# Patient Record
Sex: FEMALE | Race: WHITE | URBAN - METROPOLITAN AREA
[De-identification: names, ages, dates, MRNs, and addresses within clinical notes are randomized per-mention and may not be internally consistent; named-entity substitution may affect disease eponyms.]

---

## 2017-05-05 ENCOUNTER — OFFICE VISIT (OUTPATIENT)
Dept: FAMILY MEDICINE CLINIC | Age: 62
End: 2017-05-05

## 2017-05-05 VITALS
SYSTOLIC BLOOD PRESSURE: 124 MMHG | BODY MASS INDEX: 42.51 KG/M2 | OXYGEN SATURATION: 98 % | HEART RATE: 100 BPM | HEIGHT: 64 IN | RESPIRATION RATE: 12 BRPM | DIASTOLIC BLOOD PRESSURE: 76 MMHG | WEIGHT: 249 LBS

## 2017-05-05 DIAGNOSIS — J30.1 SEASONAL ALLERGIC RHINITIS DUE TO POLLEN: ICD-10-CM

## 2017-05-05 DIAGNOSIS — R73.9 HYPERGLYCEMIA: ICD-10-CM

## 2017-05-05 DIAGNOSIS — I89.0 LYMPHEDEMA OF LEFT ARM: Primary | ICD-10-CM

## 2017-05-05 DIAGNOSIS — E78.00 HYPERCHOLESTEREMIA: ICD-10-CM

## 2017-05-05 DIAGNOSIS — Z85.3 HISTORY OF BREAST CANCER: ICD-10-CM

## 2017-05-05 DIAGNOSIS — E55.9 VITAMIN D DEFICIENCY: ICD-10-CM

## 2017-05-05 PROCEDURE — 99214 OFFICE O/P EST MOD 30 MIN: CPT | Performed by: FAMILY MEDICINE

## 2017-05-05 RX ORDER — LISINOPRIL 10 MG/1
TABLET ORAL
Qty: 90 TABLET | Refills: 3 | Status: SHIPPED | OUTPATIENT
Start: 2017-05-05 | End: 2018-05-17 | Stop reason: SDUPTHER

## 2017-05-05 ASSESSMENT — PATIENT HEALTH QUESTIONNAIRE - PHQ9
1. LITTLE INTEREST OR PLEASURE IN DOING THINGS: 0
SUM OF ALL RESPONSES TO PHQ9 QUESTIONS 1 & 2: 0
2. FEELING DOWN, DEPRESSED OR HOPELESS: 0
SUM OF ALL RESPONSES TO PHQ QUESTIONS 1-9: 0

## 2017-05-24 LAB
ALBUMIN SERPL-MCNC: 3.9 G/DL
ALP BLD-CCNC: 64 U/L
ALT SERPL-CCNC: 24 U/L
AST SERPL-CCNC: 19 U/L
BASOPHILS ABSOLUTE: NORMAL /ΜL
BASOPHILS RELATIVE PERCENT: NORMAL %
BILIRUB SERPL-MCNC: 0.7 MG/DL (ref 0.1–1.4)
BUN BLDV-MCNC: 19 MG/DL
CALCIUM SERPL-MCNC: 9.4 MG/DL
CHLORIDE BLD-SCNC: 104 MMOL/L
CHOLESTEROL, TOTAL: 194 MG/DL
CHOLESTEROL/HDL RATIO: ABNORMAL
CO2: 28 MMOL/L
CREAT SERPL-MCNC: 0.63 MG/DL
EOSINOPHILS ABSOLUTE: NORMAL /ΜL
EOSINOPHILS RELATIVE PERCENT: NORMAL %
GFR CALCULATED: >90
GLUCOSE BLD-MCNC: 137 MG/DL
HCT VFR BLD CALC: 41.4 % (ref 36–46)
HDLC SERPL-MCNC: 41 MG/DL (ref 35–70)
HEMOGLOBIN: 14 G/DL (ref 12–16)
LDL CHOLESTEROL CALCULATED: 123 MG/DL (ref 0–160)
LYMPHOCYTES ABSOLUTE: NORMAL /ΜL
LYMPHOCYTES RELATIVE PERCENT: NORMAL %
MCH RBC QN AUTO: 29.1 PG
MCHC RBC AUTO-ENTMCNC: 33.9 G/DL
MCV RBC AUTO: 85.8 FL
MONOCYTES ABSOLUTE: NORMAL /ΜL
MONOCYTES RELATIVE PERCENT: NORMAL %
NEUTROPHILS ABSOLUTE: NORMAL /ΜL
NEUTROPHILS RELATIVE PERCENT: NORMAL %
PLATELET # BLD: 246 K/ΜL
PMV BLD AUTO: 7.7 FL
POTASSIUM SERPL-SCNC: 4.5 MMOL/L
RBC # BLD: 4.83 10^6/ΜL
SODIUM BLD-SCNC: 139 MMOL/L
TOTAL PROTEIN: 6.4
TRIGL SERPL-MCNC: 149 MG/DL
VITAMIN D 25-HYDROXY: 60.9
VITAMIN D2, 25 HYDROXY: NORMAL
VITAMIN D3,25 HYDROXY: NORMAL
VLDLC SERPL CALC-MCNC: ABNORMAL MG/DL
WBC # BLD: 5.3 10^3/ML

## 2017-11-27 ENCOUNTER — TELEPHONE (OUTPATIENT)
Dept: FAMILY MEDICINE CLINIC | Age: 62
End: 2017-11-27

## 2017-11-27 DIAGNOSIS — E78.6 LOW HDL (UNDER 40): Primary | ICD-10-CM

## 2017-11-28 LAB
ALBUMIN SERPL-MCNC: 4 G/DL
ALP BLD-CCNC: 58 U/L
ALT SERPL-CCNC: 18 U/L
ANION GAP SERPL CALCULATED.3IONS-SCNC: ABNORMAL MMOL/L
AST SERPL-CCNC: 15 U/L
AVERAGE GLUCOSE: NORMAL
BASOPHILS ABSOLUTE: NORMAL /ΜL
BASOPHILS RELATIVE PERCENT: NORMAL %
BILIRUB SERPL-MCNC: 0.5 MG/DL (ref 0.1–1.4)
BUN BLDV-MCNC: 23 MG/DL
CALCIUM SERPL-MCNC: 9.1 MG/DL
CHLORIDE BLD-SCNC: 105 MMOL/L
CHOLESTEROL, TOTAL: 196 MG/DL
CHOLESTEROL/HDL RATIO: NORMAL
CO2: 27 MMOL/L
CREAT SERPL-MCNC: 0.67 MG/DL
EOSINOPHILS ABSOLUTE: NORMAL /ΜL
EOSINOPHILS RELATIVE PERCENT: NORMAL %
GFR CALCULATED: ABNORMAL
GLUCOSE BLD-MCNC: 147 MG/DL
HBA1C MFR BLD: 6.3 %
HCT VFR BLD CALC: 42 % (ref 36–46)
HDLC SERPL-MCNC: 44 MG/DL (ref 35–70)
HEMOGLOBIN: 14 G/DL (ref 12–16)
LDL CHOLESTEROL CALCULATED: 119 MG/DL (ref 0–160)
LYMPHOCYTES ABSOLUTE: NORMAL /ΜL
LYMPHOCYTES RELATIVE PERCENT: NORMAL %
MCH RBC QN AUTO: 29.5 PG
MCHC RBC AUTO-ENTMCNC: 33.3 G/DL
MCV RBC AUTO: 88.8 FL
MONOCYTES ABSOLUTE: NORMAL /ΜL
MONOCYTES RELATIVE PERCENT: NORMAL %
NEUTROPHILS ABSOLUTE: NORMAL /ΜL
NEUTROPHILS RELATIVE PERCENT: NORMAL %
PDW BLD-RTO: 15.1 %
PLATELET # BLD: 251 K/ΜL
PMV BLD AUTO: 7.8 FL
POTASSIUM SERPL-SCNC: 4.3 MMOL/L
RBC # BLD: 4.73 10^6/ΜL
SODIUM BLD-SCNC: 135 MMOL/L
TOTAL PROTEIN: 6.6
TRIGL SERPL-MCNC: 164 MG/DL
VLDLC SERPL CALC-MCNC: NORMAL MG/DL
WBC # BLD: 6.4 10^3/ML

## 2018-01-09 NOTE — TELEPHONE ENCOUNTER
[employer] MESSAGE SENT TO PT, PT DUE FOR BLOOD WORK.   [facility]

## 2018-01-10 ENCOUNTER — TELEPHONE (OUTPATIENT)
Dept: FAMILY MEDICINE CLINIC | Age: 63
End: 2018-01-10

## 2018-03-09 ENCOUNTER — OFFICE VISIT (OUTPATIENT)
Dept: FAMILY MEDICINE CLINIC | Age: 63
End: 2018-03-09

## 2018-03-09 VITALS
RESPIRATION RATE: 18 BRPM | DIASTOLIC BLOOD PRESSURE: 74 MMHG | WEIGHT: 252 LBS | HEIGHT: 64 IN | BODY MASS INDEX: 43.02 KG/M2 | HEART RATE: 94 BPM | SYSTOLIC BLOOD PRESSURE: 128 MMHG

## 2018-03-09 DIAGNOSIS — G62.9 NEUROPATHY: ICD-10-CM

## 2018-03-09 DIAGNOSIS — E66.9 DIABETES MELLITUS TYPE 2 IN OBESE (HCC): Primary | ICD-10-CM

## 2018-03-09 DIAGNOSIS — E11.69 DIABETES MELLITUS TYPE 2 IN OBESE (HCC): Primary | ICD-10-CM

## 2018-03-09 PROCEDURE — 99213 OFFICE O/P EST LOW 20 MIN: CPT | Performed by: FAMILY MEDICINE

## 2018-03-09 RX ORDER — CHLORAL HYDRATE 500 MG
3000 CAPSULE ORAL 3 TIMES DAILY
COMMUNITY
End: 2020-01-01 | Stop reason: ALTCHOICE

## 2018-05-01 ENCOUNTER — HOSPITAL ENCOUNTER (OUTPATIENT)
Dept: OTHER | Age: 63
Discharge: HOME | End: 2018-05-01
Attending: FAMILY MEDICINE | Admitting: FAMILY MEDICINE

## 2018-05-01 ENCOUNTER — HOSPITAL ENCOUNTER (OUTPATIENT)
Dept: DIABETES SERVICES | Age: 63
Discharge: HOME | End: 2018-05-31
Admitting: FAMILY MEDICINE

## 2018-05-01 ENCOUNTER — HOSPITAL ENCOUNTER (OUTPATIENT)
Dept: DIABETES SERVICES | Age: 63
Discharge: HOME | End: 2018-04-30
Admitting: FAMILY MEDICINE

## 2018-05-01 DIAGNOSIS — E11.69 TYPE 2 DIABETES MELLITUS WITH OTHER SPECIFIED COMPLICATION (HCC): ICD-10-CM

## 2018-05-01 ASSESSMENT — PATIENT HEALTH QUESTIONNAIRE - PHQ9
SUM OF ALL RESPONSES TO PHQ QUESTIONS 1-9: 0
SUM OF ALL RESPONSES TO PHQ9 QUESTIONS 1 & 2: 0
1. LITTLE INTEREST OR PLEASURE IN DOING THINGS: 0
2. FEELING DOWN, DEPRESSED OR HOPELESS: 0

## 2018-05-17 RX ORDER — LISINOPRIL 10 MG/1
TABLET ORAL
Qty: 30 TABLET | Refills: 2 | Status: SHIPPED | OUTPATIENT
Start: 2018-05-17 | End: 2019-02-18 | Stop reason: SDUPTHER

## 2018-07-17 ENCOUNTER — HOSPITAL ENCOUNTER (OUTPATIENT)
Dept: OTHER | Age: 63
Discharge: HOME | End: 2018-07-31
Attending: FAMILY MEDICINE | Admitting: FAMILY MEDICINE

## 2018-07-17 ENCOUNTER — HOSPITAL ENCOUNTER (OUTPATIENT)
Dept: DIABETES SERVICES | Age: 63
Discharge: HOME | End: 2018-07-18
Admitting: FAMILY MEDICINE

## 2018-07-17 DIAGNOSIS — E11.69 TYPE 2 DIABETES MELLITUS WITH OTHER SPECIFIED COMPLICATION (HCC): ICD-10-CM

## 2018-08-01 ENCOUNTER — HOSPITAL ENCOUNTER (OUTPATIENT)
Dept: OTHER | Age: 63
Discharge: HOME | End: 2018-08-31
Attending: FAMILY MEDICINE | Admitting: FAMILY MEDICINE

## 2018-08-21 ENCOUNTER — HOSPITAL ENCOUNTER (OUTPATIENT)
Dept: DIABETES SERVICES | Age: 63
Discharge: HOME | End: 2018-08-22
Admitting: FAMILY MEDICINE

## 2018-08-21 DIAGNOSIS — E11.69 TYPE 2 DIABETES MELLITUS WITH OTHER SPECIFIED COMPLICATION (HCC): ICD-10-CM

## 2018-08-21 NOTE — PROGRESS NOTES
Patient attended Dietitian 2 Group Class  Reviewed and patient demonstrated understanding of the following:  Nutrition guidelines to maintain cardiovascular health  Strategies to deal with social events, restaurant meals, and emotion-based eating  Goal Setting    REFERRING PROVIDER: Saw Powell MD    Total participants in Group:2

## 2018-09-01 ENCOUNTER — HOSPITAL ENCOUNTER (OUTPATIENT)
Dept: OTHER | Age: 63
Discharge: HOME | End: 2018-09-01
Attending: FAMILY MEDICINE | Admitting: FAMILY MEDICINE

## 2018-10-17 LAB
AVERAGE GLUCOSE: NORMAL
HBA1C MFR BLD: 6.4 %

## 2018-10-24 ENCOUNTER — OFFICE VISIT (OUTPATIENT)
Dept: FAMILY MEDICINE CLINIC | Age: 63
End: 2018-10-24
Payer: MEDICARE

## 2018-10-24 VITALS
HEIGHT: 64 IN | BODY MASS INDEX: 41.32 KG/M2 | RESPIRATION RATE: 16 BRPM | SYSTOLIC BLOOD PRESSURE: 130 MMHG | DIASTOLIC BLOOD PRESSURE: 74 MMHG | WEIGHT: 242 LBS | HEART RATE: 66 BPM

## 2018-10-24 DIAGNOSIS — Z85.3 HISTORY OF BREAST CANCER: ICD-10-CM

## 2018-10-24 DIAGNOSIS — G62.89 OTHER POLYNEUROPATHY: ICD-10-CM

## 2018-10-24 DIAGNOSIS — I10 ESSENTIAL HYPERTENSION, BENIGN: Primary | ICD-10-CM

## 2018-10-24 DIAGNOSIS — E78.00 PURE HYPERCHOLESTEROLEMIA: ICD-10-CM

## 2018-10-24 DIAGNOSIS — I89.0 LYMPHEDEMA OF LEFT ARM: ICD-10-CM

## 2018-10-24 DIAGNOSIS — J30.1 SEASONAL ALLERGIC RHINITIS DUE TO POLLEN: ICD-10-CM

## 2018-10-24 DIAGNOSIS — Z23 NEED FOR INFLUENZA VACCINATION: ICD-10-CM

## 2018-10-24 PROCEDURE — 1123F ACP DISCUSS/DSCN MKR DOCD: CPT | Performed by: FAMILY MEDICINE

## 2018-10-24 PROCEDURE — 1036F TOBACCO NON-USER: CPT | Performed by: FAMILY MEDICINE

## 2018-10-24 PROCEDURE — G8417 CALC BMI ABV UP PARAM F/U: HCPCS | Performed by: FAMILY MEDICINE

## 2018-10-24 PROCEDURE — G8427 DOCREV CUR MEDS BY ELIG CLIN: HCPCS | Performed by: FAMILY MEDICINE

## 2018-10-24 PROCEDURE — G8400 PT W/DXA NO RESULTS DOC: HCPCS | Performed by: FAMILY MEDICINE

## 2018-10-24 PROCEDURE — 3017F COLORECTAL CA SCREEN DOC REV: CPT | Performed by: FAMILY MEDICINE

## 2018-10-24 PROCEDURE — 4040F PNEUMOC VAC/ADMIN/RCVD: CPT | Performed by: FAMILY MEDICINE

## 2018-10-24 PROCEDURE — 90670 PCV13 VACCINE IM: CPT | Performed by: FAMILY MEDICINE

## 2018-10-24 PROCEDURE — 99214 OFFICE O/P EST MOD 30 MIN: CPT | Performed by: FAMILY MEDICINE

## 2018-10-24 PROCEDURE — G0009 ADMIN PNEUMOCOCCAL VACCINE: HCPCS | Performed by: FAMILY MEDICINE

## 2018-10-24 PROCEDURE — 1101F PT FALLS ASSESS-DOCD LE1/YR: CPT | Performed by: FAMILY MEDICINE

## 2018-10-24 PROCEDURE — G8482 FLU IMMUNIZE ORDER/ADMIN: HCPCS | Performed by: FAMILY MEDICINE

## 2018-10-24 PROCEDURE — 1090F PRES/ABSN URINE INCON ASSESS: CPT | Performed by: FAMILY MEDICINE

## 2018-10-24 RX ORDER — FLUCONAZOLE 150 MG/1
150 TABLET ORAL ONCE
Qty: 1 TABLET | Refills: 0 | Status: SHIPPED | OUTPATIENT
Start: 2018-10-24 | End: 2018-10-24

## 2018-10-24 RX ORDER — L-METHYLFOLATE-ALGAE-VIT B12-B6 CAP 3-90.314-2-35 MG 3-90.314-2-35 MG
1 CAP ORAL 2 TIMES DAILY
Qty: 180 CAPSULE | Refills: 0 | Status: SHIPPED | OUTPATIENT
Start: 2018-10-24 | End: 2019-03-15 | Stop reason: SDUPTHER

## 2018-10-24 RX ORDER — DOXYCYCLINE HYCLATE 100 MG
100 TABLET ORAL
COMMUNITY
Start: 2018-10-11 | End: 2018-10-25

## 2018-10-24 ASSESSMENT — PATIENT HEALTH QUESTIONNAIRE - PHQ9
2. FEELING DOWN, DEPRESSED OR HOPELESS: 0
SUM OF ALL RESPONSES TO PHQ9 QUESTIONS 1 & 2: 0
SUM OF ALL RESPONSES TO PHQ QUESTIONS 1-9: 0
SUM OF ALL RESPONSES TO PHQ QUESTIONS 1-9: 0
1. LITTLE INTEREST OR PLEASURE IN DOING THINGS: 0

## 2019-01-01 ENCOUNTER — OFFICE VISIT (OUTPATIENT)
Dept: FAMILY MEDICINE CLINIC | Age: 64
End: 2019-01-01
Payer: MEDICARE

## 2019-01-01 ENCOUNTER — TELEPHONE (OUTPATIENT)
Dept: FAMILY MEDICINE CLINIC | Age: 64
End: 2019-01-01

## 2019-01-01 VITALS
HEIGHT: 64 IN | WEIGHT: 241 LBS | DIASTOLIC BLOOD PRESSURE: 82 MMHG | RESPIRATION RATE: 16 BRPM | SYSTOLIC BLOOD PRESSURE: 132 MMHG | BODY MASS INDEX: 41.15 KG/M2 | TEMPERATURE: 98.1 F | HEART RATE: 64 BPM

## 2019-01-01 VITALS
RESPIRATION RATE: 16 BRPM | WEIGHT: 237 LBS | HEART RATE: 78 BPM | BODY MASS INDEX: 40.46 KG/M2 | DIASTOLIC BLOOD PRESSURE: 70 MMHG | HEIGHT: 64 IN | SYSTOLIC BLOOD PRESSURE: 122 MMHG

## 2019-01-01 DIAGNOSIS — R73.9 HYPERGLYCEMIA: ICD-10-CM

## 2019-01-01 DIAGNOSIS — F41.9 ANXIETY: Primary | ICD-10-CM

## 2019-01-01 DIAGNOSIS — R73.03 PREDIABETES: ICD-10-CM

## 2019-01-01 DIAGNOSIS — E66.01 MORBID OBESITY WITH BMI OF 40.0-44.9, ADULT (HCC): ICD-10-CM

## 2019-01-01 DIAGNOSIS — E78.00 PURE HYPERCHOLESTEROLEMIA: ICD-10-CM

## 2019-01-01 DIAGNOSIS — N30.01 ACUTE CYSTITIS WITH HEMATURIA: ICD-10-CM

## 2019-01-01 DIAGNOSIS — K52.9 ACUTE GASTROENTERITIS: Primary | ICD-10-CM

## 2019-01-01 DIAGNOSIS — M25.511 CHRONIC RIGHT SHOULDER PAIN: ICD-10-CM

## 2019-01-01 DIAGNOSIS — I10 ESSENTIAL HYPERTENSION, BENIGN: ICD-10-CM

## 2019-01-01 DIAGNOSIS — G89.29 CHRONIC RIGHT SHOULDER PAIN: ICD-10-CM

## 2019-01-01 DIAGNOSIS — R45.89 DEPRESSED MOOD: ICD-10-CM

## 2019-01-01 DIAGNOSIS — C25.9 MALIGNANT NEOPLASM OF PANCREAS, UNSPECIFIED LOCATION OF MALIGNANCY (HCC): Primary | ICD-10-CM

## 2019-01-01 LAB
A/G RATIO: 1.7 (ref 1.1–2.2)
ALBUMIN SERPL-MCNC: 4.5 G/DL (ref 3.4–5)
ALP BLD-CCNC: 259 U/L (ref 40–129)
ALT SERPL-CCNC: 1686 U/L (ref 10–40)
AMYLASE: 36 U/L (ref 25–115)
ANION GAP SERPL CALCULATED.3IONS-SCNC: 18 MMOL/L (ref 3–16)
AST SERPL-CCNC: >7000 U/L (ref 15–37)
BASOPHILS ABSOLUTE: 0 K/UL (ref 0–0.2)
BASOPHILS RELATIVE PERCENT: 0.8 %
BILIRUB SERPL-MCNC: 5.8 MG/DL (ref 0–1)
BILIRUBIN, POC: ABNORMAL
BLOOD URINE, POC: ABNORMAL
BUN BLDV-MCNC: 17 MG/DL (ref 7–20)
C-REACTIVE PROTEIN, HIGH SENSITIVITY: 13.57 MG/L (ref 0.16–3)
CALCIUM SERPL-MCNC: 9.9 MG/DL (ref 8.3–10.6)
CHLORIDE BLD-SCNC: 101 MMOL/L (ref 99–110)
CHOLESTEROL, TOTAL: 246 MG/DL (ref 0–199)
CLARITY, POC: CLEAR
CO2: 21 MMOL/L (ref 21–32)
COLOR, POC: YELLOW
CREAT SERPL-MCNC: <0.5 MG/DL (ref 0.6–1.2)
EOSINOPHILS ABSOLUTE: 0.1 K/UL (ref 0–0.6)
EOSINOPHILS RELATIVE PERCENT: 2.7 %
ESTIMATED AVERAGE GLUCOSE: 139.9 MG/DL
GFR AFRICAN AMERICAN: >60
GFR NON-AFRICAN AMERICAN: >60
GLOBULIN: 2.7 G/DL
GLUCOSE BLD-MCNC: 171 MG/DL (ref 70–99)
GLUCOSE URINE, POC: ABNORMAL
HBA1C MFR BLD: 6.5 %
HCT VFR BLD CALC: 44.7 % (ref 36–48)
HDLC SERPL-MCNC: 71 MG/DL (ref 40–60)
HEMOGLOBIN: 14.8 G/DL (ref 12–16)
KETONES, POC: ABNORMAL
LDL CHOLESTEROL CALCULATED: 147 MG/DL
LEUKOCYTE EST, POC: ABNORMAL
LIPASE: 54 U/L (ref 13–60)
LYMPHOCYTES ABSOLUTE: 0.9 K/UL (ref 1–5.1)
LYMPHOCYTES RELATIVE PERCENT: 18.2 %
MCH RBC QN AUTO: 29.5 PG (ref 26–34)
MCHC RBC AUTO-ENTMCNC: 33 G/DL (ref 31–36)
MCV RBC AUTO: 89.4 FL (ref 80–100)
MONOCYTES ABSOLUTE: 0.4 K/UL (ref 0–1.3)
MONOCYTES RELATIVE PERCENT: 8.4 %
NEUTROPHILS ABSOLUTE: 3.3 K/UL (ref 1.7–7.7)
NEUTROPHILS RELATIVE PERCENT: 69.9 %
NITRITE, POC: ABNORMAL
PDW BLD-RTO: 15.5 % (ref 12.4–15.4)
PH, POC: 5.5
PLATELET # BLD: 261 K/UL (ref 135–450)
PMV BLD AUTO: 8.6 FL (ref 5–10.5)
POTASSIUM SERPL-SCNC: 4.2 MMOL/L (ref 3.5–5.1)
PROTEIN, POC: ABNORMAL
RBC # BLD: 5 M/UL (ref 4–5.2)
SEDIMENTATION RATE, ERYTHROCYTE: 19 MM/HR (ref 0–30)
SODIUM BLD-SCNC: 140 MMOL/L (ref 136–145)
SPECIFIC GRAVITY, POC: 1.03
TOTAL PROTEIN: 7.2 G/DL (ref 6.4–8.2)
TRIGL SERPL-MCNC: 138 MG/DL (ref 0–150)
URINE CULTURE, ROUTINE: NORMAL
UROBILINOGEN, POC: 1
VLDLC SERPL CALC-MCNC: 28 MG/DL
WBC # BLD: 4.7 K/UL (ref 4–11)

## 2019-01-01 PROCEDURE — 99214 OFFICE O/P EST MOD 30 MIN: CPT | Performed by: REGISTERED NURSE

## 2019-01-01 PROCEDURE — 1090F PRES/ABSN URINE INCON ASSESS: CPT | Performed by: FAMILY MEDICINE

## 2019-01-01 PROCEDURE — G8400 PT W/DXA NO RESULTS DOC: HCPCS | Performed by: FAMILY MEDICINE

## 2019-01-01 PROCEDURE — 99214 OFFICE O/P EST MOD 30 MIN: CPT | Performed by: FAMILY MEDICINE

## 2019-01-01 PROCEDURE — G8427 DOCREV CUR MEDS BY ELIG CLIN: HCPCS | Performed by: REGISTERED NURSE

## 2019-01-01 PROCEDURE — 1090F PRES/ABSN URINE INCON ASSESS: CPT | Performed by: REGISTERED NURSE

## 2019-01-01 PROCEDURE — 3017F COLORECTAL CA SCREEN DOC REV: CPT | Performed by: FAMILY MEDICINE

## 2019-01-01 PROCEDURE — 1036F TOBACCO NON-USER: CPT | Performed by: FAMILY MEDICINE

## 2019-01-01 PROCEDURE — 36415 COLL VENOUS BLD VENIPUNCTURE: CPT | Performed by: REGISTERED NURSE

## 2019-01-01 PROCEDURE — 1123F ACP DISCUSS/DSCN MKR DOCD: CPT | Performed by: FAMILY MEDICINE

## 2019-01-01 PROCEDURE — G8417 CALC BMI ABV UP PARAM F/U: HCPCS | Performed by: FAMILY MEDICINE

## 2019-01-01 PROCEDURE — 4040F PNEUMOC VAC/ADMIN/RCVD: CPT | Performed by: FAMILY MEDICINE

## 2019-01-01 PROCEDURE — G8417 CALC BMI ABV UP PARAM F/U: HCPCS | Performed by: REGISTERED NURSE

## 2019-01-01 PROCEDURE — 1123F ACP DISCUSS/DSCN MKR DOCD: CPT | Performed by: REGISTERED NURSE

## 2019-01-01 PROCEDURE — 81002 URINALYSIS NONAUTO W/O SCOPE: CPT | Performed by: REGISTERED NURSE

## 2019-01-01 PROCEDURE — 1036F TOBACCO NON-USER: CPT | Performed by: REGISTERED NURSE

## 2019-01-01 PROCEDURE — 4040F PNEUMOC VAC/ADMIN/RCVD: CPT | Performed by: REGISTERED NURSE

## 2019-01-01 PROCEDURE — G8400 PT W/DXA NO RESULTS DOC: HCPCS | Performed by: REGISTERED NURSE

## 2019-01-01 PROCEDURE — G8427 DOCREV CUR MEDS BY ELIG CLIN: HCPCS | Performed by: FAMILY MEDICINE

## 2019-01-01 PROCEDURE — 3017F COLORECTAL CA SCREEN DOC REV: CPT | Performed by: REGISTERED NURSE

## 2019-01-01 RX ORDER — CIPROFLOXACIN 250 MG/1
250 TABLET, FILM COATED ORAL 2 TIMES DAILY
Qty: 14 TABLET | Refills: 0 | Status: SHIPPED | OUTPATIENT
Start: 2019-01-01 | End: 2019-01-01

## 2019-01-01 RX ORDER — POLYETHYLENE GLYCOL 3350 17 G/17G
POWDER, FOR SOLUTION ORAL
Refills: 0 | COMMUNITY
Start: 2019-01-01

## 2019-01-01 RX ORDER — ONDANSETRON 4 MG/1
4 TABLET, ORALLY DISINTEGRATING ORAL
COMMUNITY
Start: 2019-01-01 | End: 2020-01-01 | Stop reason: ALTCHOICE

## 2019-01-01 RX ORDER — PANTOPRAZOLE SODIUM 40 MG/1
40 TABLET, DELAYED RELEASE ORAL
COMMUNITY
Start: 2019-01-01 | End: 2020-01-01

## 2019-01-01 RX ORDER — LISINOPRIL 10 MG/1
TABLET ORAL
Qty: 90 TABLET | Refills: 0 | Status: SHIPPED | OUTPATIENT
Start: 2019-01-01 | End: 2020-01-01

## 2019-01-01 RX ORDER — ONDANSETRON 8 MG/1
8 TABLET, ORALLY DISINTEGRATING ORAL EVERY 8 HOURS PRN
Qty: 12 TABLET | Refills: 1 | Status: SHIPPED | OUTPATIENT
Start: 2019-01-01

## 2019-01-01 RX ORDER — LORAZEPAM 1 MG/1
TABLET ORAL
Qty: 2 TABLET | Refills: 0 | Status: SHIPPED | OUTPATIENT
Start: 2019-01-01 | End: 2019-01-01

## 2019-01-01 ASSESSMENT — ENCOUNTER SYMPTOMS
DIARRHEA: 1
ABDOMINAL PAIN: 0
ANAL BLEEDING: 0
CONSTIPATION: 1
NAUSEA: 1
BLOOD IN STOOL: 0
VOMITING: 0
ABDOMINAL DISTENTION: 1
RECTAL PAIN: 0

## 2019-01-01 ASSESSMENT — PATIENT HEALTH QUESTIONNAIRE - PHQ9
1. LITTLE INTEREST OR PLEASURE IN DOING THINGS: 0
SUM OF ALL RESPONSES TO PHQ9 QUESTIONS 1 & 2: 0
2. FEELING DOWN, DEPRESSED OR HOPELESS: 0
SUM OF ALL RESPONSES TO PHQ QUESTIONS 1-9: 0
SUM OF ALL RESPONSES TO PHQ QUESTIONS 1-9: 0

## 2019-02-18 ENCOUNTER — TELEPHONE (OUTPATIENT)
Dept: FAMILY MEDICINE CLINIC | Age: 64
End: 2019-02-18

## 2019-02-18 DIAGNOSIS — R73.9 ELEVATED BLOOD SUGAR: ICD-10-CM

## 2019-02-18 DIAGNOSIS — E78.00 PURE HYPERCHOLESTEROLEMIA: Primary | ICD-10-CM

## 2019-02-18 RX ORDER — LISINOPRIL 10 MG/1
TABLET ORAL
Qty: 30 TABLET | Refills: 2 | Status: SHIPPED | OUTPATIENT
Start: 2019-02-18 | End: 2019-05-13 | Stop reason: SDUPTHER

## 2019-03-08 ENCOUNTER — TELEPHONE (OUTPATIENT)
Dept: FAMILY MEDICINE CLINIC | Age: 64
End: 2019-03-08

## 2019-03-15 RX ORDER — L-METHYLFOLATE-ALGAE-VIT B12-B6 CAP 3-90.314-2-35 MG 3-90.314-2-35 MG
CAP ORAL
Qty: 180 CAPSULE | Refills: 0 | Status: SHIPPED | OUTPATIENT
Start: 2019-03-15

## 2019-05-13 ENCOUNTER — TELEPHONE (OUTPATIENT)
Dept: FAMILY MEDICINE CLINIC | Age: 64
End: 2019-05-13

## 2019-05-13 RX ORDER — LISINOPRIL 10 MG/1
TABLET ORAL
Qty: 90 TABLET | Refills: 0 | Status: SHIPPED | OUTPATIENT
Start: 2019-05-13 | End: 2019-08-13 | Stop reason: SDUPTHER

## 2019-08-14 RX ORDER — LISINOPRIL 10 MG/1
TABLET ORAL
Qty: 90 TABLET | Refills: 0 | Status: SHIPPED | OUTPATIENT
Start: 2019-08-14 | End: 2019-01-01 | Stop reason: SDUPTHER

## 2019-08-16 PROBLEM — R73.03 PREDIABETES: Status: ACTIVE | Noted: 2019-01-01

## 2019-08-16 NOTE — PATIENT INSTRUCTIONS
a key part of your treatment and safety. Be sure to make and go to all appointments, and call your doctor if you are having problems. It's also a good idea to know your test results and keep a list of the medicines you take. How can you care for yourself at home? · If your doctor prescribed antibiotics, take them as directed. Do not stop taking them just because you feel better. You need to take the full course of antibiotics. · Drink plenty of fluids to prevent dehydration, enough so that your urine is light yellow or clear like water. Choose water and other caffeine-free clear liquids until you feel better. If you have kidney, heart, or liver disease and have to limit fluids, talk with your doctor before you increase your fluid intake. · Drink fluids slowly, in frequent, small amounts, because drinking too much too fast can cause vomiting. · Begin eating mild foods, such as dry toast, yogurt, applesauce, bananas, and rice. Avoid spicy, hot, or high-fat foods, and do not drink alcohol or caffeine for a day or two. Do not drink milk or eat ice cream until you are feeling better. How to prevent gastroenteritis  · Keep hot foods hot and cold foods cold. · Do not eat meats, dressings, salads, or other foods that have been kept at room temperature for more than 2 hours. · Use a thermometer to check your refrigerator. It should be between 34°F and 40°F.  · Defrost meats in the refrigerator or microwave, not on the kitchen counter. · Keep your hands and your kitchen clean. Wash your hands, cutting boards, and countertops with hot soapy water frequently. · Cook meat until it is well done. · Do not eat raw eggs or uncooked sauces made with raw eggs. · Do not take chances. If food looks or tastes spoiled, throw it out. When should you call for help? Call 911 anytime you think you may need emergency care.  For example, call if:    · You vomit blood or what looks like coffee grounds.     · You passed out (lost consciousness).     · You pass maroon or very bloody stools.    Call your doctor now or seek immediate medical care if:    · You have severe belly pain.     · You have signs of needing more fluids. You have sunken eyes, a dry mouth, and pass only a little dark urine.     · You feel like you are going to faint.     · You have increased belly pain that does not go away in 1 to 2 days.     · You have new or increased nausea, or you are vomiting.     · You have a new or higher fever.     · Your stools are black and tarlike or have streaks of blood.    Watch closely for changes in your health, and be sure to contact your doctor if:    · You are dizzy or lightheaded.     · You urinate less than usual, or your urine is dark yellow or brown.     · You do not feel better with each day that goes by. Where can you learn more? Go to https://[employer]. org and sign in to your Qnary account. Enter N142 in the Revenew box to learn more about \"Gastroenteritis: Care Instructions. \"     If you do not have an account, please click on the \"Sign Up Now\" link. Current as of: July 30, 2018  Content Version: 12.1  © 0646-4640 [employer]. Care instructions adapted under license by [facility] ([facility]). If you have questions about a medical condition or this instruction, always ask your healthcare professional. [place] [address] any warranty or liability for your use of this information. Patient Education        Urinary Tract Infection in Women: Care Instructions  Your Care Instructions    A urinary tract infection, or UTI, is a general term for an infection anywhere between the kidneys and the urethra (where urine comes out). Most UTIs are bladder infections. They often cause pain or burning when you urinate. UTIs are caused by bacteria and can be cured with antibiotics. Be sure to complete your treatment so that the infection goes away.   Follow-up care is a key part of your treatment and safety. Be sure to make and go to all appointments, and call your doctor if you are having problems. It's also a good idea to know your test results and keep a list of the medicines you take. How can you care for yourself at home? · Take your antibiotics as directed. Do not stop taking them just because you feel better. You need to take the full course of antibiotics. · Drink extra water and other fluids for the next day or two. This may help wash out the bacteria that are causing the infection. (If you have kidney, heart, or liver disease and have to limit fluids, talk with your doctor before you increase your fluid intake.)  · Avoid drinks that are carbonated or have caffeine. They can irritate the bladder. · Urinate often. Try to empty your bladder each time. · To relieve pain, take a hot bath or lay a heating pad set on low over your lower belly or genital area. Never go to sleep with a heating pad in place. To prevent UTIs  · Drink plenty of water each day. This helps you urinate often, which clears bacteria from your system. (If you have kidney, heart, or liver disease and have to limit fluids, talk with your doctor before you increase your fluid intake.)  · Urinate when you need to. · Urinate right after you have sex. · Change sanitary pads often. · Avoid douches, bubble baths, feminine hygiene sprays, and other feminine hygiene products that have deodorants. · After going to the bathroom, wipe from front to back. When should you call for help? Call your doctor now or seek immediate medical care if:    · Symptoms such as fever, chills, nausea, or vomiting get worse or appear for the first time.     · You have new pain in your back just below your rib cage.  This is called flank pain.     · There is new blood or pus in your urine.     · You have any problems with your antibiotic medicine.    Watch closely for changes in your health, and be sure to contact your doctor if:    · You

## 2019-08-16 NOTE — PROGRESS NOTES
Comprehensive Metabolic Panel  - CBC Auto Differential  - ondansetron (ZOFRAN ODT) 8 MG TBDP disintegrating tablet; Take 1 tablet by mouth every 8 hours as needed for Nausea  Dispense: 12 tablet; Refill: 1    2. Acute cystitis with hematuria  UA positive for blood and infection. Treat with Cipro. Sent for culture. Push water, probiotics, Vit C, and cranberry. - ciprofloxacin (CIPRO) 250 MG tablet; Take 1 tablet by mouth 2 times daily for 7 days  Dispense: 14 tablet; Refill: 0  - POCT Urinalysis no Micro  - Urine Culture; Future    3. Essential hypertension, benign  - Comprehensive Metabolic Panel; Future  - Comprehensive Metabolic Panel    4. Pure hypercholesterolemia  - Lipid Panel; Future  - Lipid Panel    5. Prediabetes  - Hemoglobin A1C; Future  - Hemoglobin A1C      Orders Placed This Encounter   Procedures    Urine Culture     Standing Status:   Future     Standing Expiration Date:   8/15/2020     Order Specific Question:   Specify (ex-cath, midstream, cysto, etc)?      Answer:   MIDSTREAM    CBC Auto Differential     Standing Status:   Future     Number of Occurrences:   1     Standing Expiration Date:   8/15/2020    Comprehensive Metabolic Panel     Standing Status:   Future     Number of Occurrences:   1     Standing Expiration Date:   8/15/2020    CRP,High Sensitivity     Standing Status:   Future     Number of Occurrences:   1     Standing Expiration Date:   8/15/2020    Sedimentation Rate     Standing Status:   Future     Number of Occurrences:   1     Standing Expiration Date:   8/15/2020    Lipase     Standing Status:   Future     Number of Occurrences:   1     Standing Expiration Date:   8/15/2020    Amylase     Standing Status:   Future     Number of Occurrences:   1     Standing Expiration Date:   8/15/2020    Hemoglobin A1C     Standing Status:   Future     Number of Occurrences:   1     Standing Expiration Date:   8/15/2020    Lipid Panel     Standing Status:   Future     Number of

## 2020-01-01 ENCOUNTER — TELEPHONE (OUTPATIENT)
Dept: FAMILY MEDICINE CLINIC | Age: 65
End: 2020-01-01

## 2020-01-01 ENCOUNTER — OFFICE VISIT (OUTPATIENT)
Dept: FAMILY MEDICINE CLINIC | Age: 65
End: 2020-01-01
Payer: MEDICARE

## 2020-01-01 VITALS
TEMPERATURE: 98.3 F | OXYGEN SATURATION: 98 % | DIASTOLIC BLOOD PRESSURE: 72 MMHG | WEIGHT: 224 LBS | SYSTOLIC BLOOD PRESSURE: 118 MMHG | HEIGHT: 64 IN | HEART RATE: 97 BPM | RESPIRATION RATE: 16 BRPM | BODY MASS INDEX: 38.24 KG/M2

## 2020-01-01 PROCEDURE — 1036F TOBACCO NON-USER: CPT | Performed by: REGISTERED NURSE

## 2020-01-01 PROCEDURE — 1123F ACP DISCUSS/DSCN MKR DOCD: CPT | Performed by: REGISTERED NURSE

## 2020-01-01 PROCEDURE — 4040F PNEUMOC VAC/ADMIN/RCVD: CPT | Performed by: REGISTERED NURSE

## 2020-01-01 PROCEDURE — G8427 DOCREV CUR MEDS BY ELIG CLIN: HCPCS | Performed by: REGISTERED NURSE

## 2020-01-01 PROCEDURE — 1090F PRES/ABSN URINE INCON ASSESS: CPT | Performed by: REGISTERED NURSE

## 2020-01-01 PROCEDURE — G8484 FLU IMMUNIZE NO ADMIN: HCPCS | Performed by: REGISTERED NURSE

## 2020-01-01 PROCEDURE — G8417 CALC BMI ABV UP PARAM F/U: HCPCS | Performed by: REGISTERED NURSE

## 2020-01-01 PROCEDURE — 99213 OFFICE O/P EST LOW 20 MIN: CPT | Performed by: REGISTERED NURSE

## 2020-01-01 PROCEDURE — G8400 PT W/DXA NO RESULTS DOC: HCPCS | Performed by: REGISTERED NURSE

## 2020-01-01 PROCEDURE — 3017F COLORECTAL CA SCREEN DOC REV: CPT | Performed by: REGISTERED NURSE

## 2020-01-01 RX ORDER — PANTOPRAZOLE SODIUM 40 MG/1
TABLET, DELAYED RELEASE ORAL
Qty: 180 TABLET | Refills: 1 | Status: SHIPPED | OUTPATIENT
Start: 2020-01-01

## 2020-01-01 RX ORDER — PANTOPRAZOLE SODIUM 40 MG/1
TABLET, DELAYED RELEASE ORAL
Qty: 180 TABLET | OUTPATIENT
Start: 2020-01-01

## 2020-01-01 RX ORDER — FLUCONAZOLE 100 MG/1
100 TABLET ORAL DAILY
Refills: 3 | COMMUNITY
Start: 2019-01-01

## 2020-01-01 RX ORDER — DEXAMETHASONE 4 MG/1
4 TABLET ORAL DAILY
Refills: 3 | COMMUNITY
Start: 2019-01-01

## 2020-01-01 RX ORDER — LISINOPRIL 10 MG/1
TABLET ORAL
Qty: 90 TABLET | Refills: 0 | Status: SHIPPED | OUTPATIENT
Start: 2020-01-01 | End: 2020-01-01 | Stop reason: SDUPTHER

## 2020-01-01 RX ORDER — LISINOPRIL 10 MG/1
TABLET ORAL
Qty: 90 TABLET | Refills: 0 | Status: SHIPPED | OUTPATIENT
Start: 2020-01-01

## 2020-01-01 RX ORDER — AZITHROMYCIN 250 MG/1
250 TABLET, FILM COATED ORAL SEE ADMIN INSTRUCTIONS
Qty: 6 TABLET | Refills: 0 | Status: SHIPPED | OUTPATIENT
Start: 2020-01-01 | End: 2020-01-01

## 2020-01-01 RX ORDER — HALOPERIDOL 0.5 MG/1
TABLET ORAL
Qty: 50 TABLET | Refills: 0 | Status: SHIPPED | OUTPATIENT
Start: 2020-01-01

## 2020-01-01 RX ORDER — DOXYCYCLINE HYCLATE 100 MG
100 TABLET ORAL 2 TIMES DAILY
Qty: 20 TABLET | Refills: 0 | Status: SHIPPED | OUTPATIENT
Start: 2020-01-01 | End: 2020-01-01

## 2020-01-01 ASSESSMENT — ENCOUNTER SYMPTOMS
SHORTNESS OF BREATH: 0
COUGH: 1
TROUBLE SWALLOWING: 0
WHEEZING: 0
CHEST TIGHTNESS: 0
SINUS PAIN: 1
SORE THROAT: 0
RHINORRHEA: 1
SINUS PRESSURE: 1

## 2020-01-01 ASSESSMENT — PATIENT HEALTH QUESTIONNAIRE - PHQ9
2. FEELING DOWN, DEPRESSED OR HOPELESS: 1
SUM OF ALL RESPONSES TO PHQ9 QUESTIONS 1 & 2: 2
1. LITTLE INTEREST OR PLEASURE IN DOING THINGS: 1
SUM OF ALL RESPONSES TO PHQ QUESTIONS 1-9: 2
SUM OF ALL RESPONSES TO PHQ QUESTIONS 1-9: 2

## 2020-01-13 NOTE — PATIENT INSTRUCTIONS
Mucinex, Flonase, Delsym and Zyrtec  For the first 7-14 days of symptoms follow instructions below, even before being seen in the office or even during treatment with antibiotics, until symptom free. 1. Water: Drink 1 ounce of water for every 2 pounds of body weight for adults, 90 Ounces of water per day. This will loosen mucus in the head and chest & improve the weak feeling of dehydration, allow the body to get germ fighting resources to the infection. Half can be juice or sugar free Crystal Light. Don't count drinks with caffeine or carbonation. Infants can have Pedialyte liquid or freezer pops. Avoid salt if you have high Blood Pressure, swelling in the feet or ankles or have heart problems. 2. Humidity: Humidify the air to 35-50% ( or until the windows fog over slightly).  Summer use of an air conditioner turned down too far and can result in dry air. Can use a humidifier, vaporizer, boil water on the stove or put a coffee can full of water on the heater vents. This will loosen mucus from infections and allergies. 3. Sleep: Get 8-10 hours a night and rest during the evening after work or school. If you have trouble sleeping, adults can take Melatonin 5mg up to 2 tabs at bedtime ( not for children or pregnant women). If Mono is suspected then sleep during 9PM to 9AM time span (if possible.)   4. Cough: Take cough medicines with Guaifenesin ( to loosen chest or head congestion) and Dextromethorphan ( to decrease excess cough). Robitussin D.M. Syrup every 4-6 hrs or Mucinex D. M. pills twice a day. Use the pediatric formulations for children over 6 months making sure they are alcohol & sugar free for children, pregnant women, and diabetics. 5. Pain And Fevers: Take Acetaminophen ( Tylenol) for fevers, aches, and headaches. 2-500 mg every 8 hours for adults. Appropriate doses at bedtime for children may help them sleep better. If pregnant take 1 -500 mg (Tylenol) every 8 hours as needed.  Ibuprofen may be used if not pregnant, but should be given with food to avoid nausea. Avoid Ibuprofen if you have high blood pressure, CHF, or kidney problems. 6.Gargle: (DAY ONE OF SYMPTOMS) Gargle in the back of the throat with the head tilted back and to the sides with a strong mouthwash  ( Listerine or Scope) after meals and at bedtime at least 4 -5 times a day. This helps kill bacteria and viruses in the back of the throat and will shorten the duration and decrease the severity of your symptoms: sore throat, cough, ear popping,/ear pain, and possibly dizziness. 7. Smoking: Avoid smoking or exposure to second hand smoke. 8. Zinc: (DAY ONE OF SYMPTOMS)  Zinc lozenges such as Cold Daniel (available most stores), or Basic ([employer] brand) will help shorten the duration and lessen symptoms such as sore throat, cough, nasal congestion, runny nose, and post nasal drip. Use 1 lozenge every 2-4 hours ( after meals if stomach is sensitive). Children can use 10-15 mg or less 3-4 times a day or Zinc lollypops. In pregnancy limit to 50-60 mg a day for 7 days as prenatals have Zinc also.    With diarrhea use zinc pills 50 mg 1/2 to 1 pill 2x/day. 9. Vitamins: Vitamin C 500 mg with breakfast and dinner. Children and pregnant women should drink citrus juices. This speeds healing and strengthens immune system. 10. Chest Symptoms: Vicks Vapor rub to the chest at bedtime. 11. Decongestants: Avoid all decongestants if you have high blood pressure. Safe to take if you do not have high blood pressure. Try all of the above starting with day 1 of symptoms. If Strep throat symptoms appear call to be seen in the office as soon as possible and don't gargle on that day. Newborns, infants, or anyone with earaches or influenza may need to be seen quickly. Adults with fevers over 103 degrees or shortness of breath should call the office immediately.       Patient Education        Viral Respiratory Infection: Care Instructions  Your Care Instructions    Viruses are very small organisms. They grow in number after they enter your body. There are many types that cause different illnesses, such as colds and the mumps. The symptoms of a viral respiratory infection often start quickly. They include a fever, sore throat, and runny nose. You may also just not feel well. Or you may not want to eat much. Most viral respiratory infections are not serious. They usually get better with time and self-care. Antibiotics are not used to treat a viral infection. That's because antibiotics will not help cure a viral illness. In some cases, antiviral medicine can help your body fight a serious viral infection. Follow-up care is a key part of your treatment and safety. Be sure to make and go to all appointments, and call your doctor if you are having problems. It's also a good idea to know your test results and keep a list of the medicines you take. How can you care for yourself at home? · Rest as much as possible until you feel better. · Be safe with medicines. Take your medicine exactly as prescribed. Call your doctor if you think you are having a problem with your medicine. You will get more details on the specific medicine your doctor prescribes. · Take an over-the-counter pain medicine, such as acetaminophen (Tylenol), ibuprofen (Advil, Motrin), or naproxen (Aleve), as needed for pain and fever. Read and follow all instructions on the label. Do not give aspirin to anyone younger than 20. It has been linked to Reye syndrome, a serious illness. · Drink plenty of fluids, enough so that your urine is light yellow or clear like water. Hot fluids, such as tea or soup, may help relieve congestion in your nose and throat. If you have kidney, heart, or liver disease and have to limit fluids, talk with your doctor before you increase the amount of fluids you drink. · Try to clear mucus from your lungs by breathing deeply and coughing.   · Gargle with warm salt water once an hour. This can help reduce swelling and throat pain. Use 1 teaspoon of salt mixed in 1 cup of warm water. · Do not smoke or allow others to smoke around you. If you need help quitting, talk to your doctor about stop-smoking programs and medicines. These can increase your chances of quitting for good. To avoid spreading the virus  · Cough or sneeze into a tissue. Then throw the tissue away. · If you don't have a tissue, use your hand to cover your cough or sneeze. Then clean your hand. You can also cough into your sleeve. · Wash your hands often. Use soap and warm water. Wash for 15 to 20 seconds each time. · If you don't have soap and water near you, you can clean your hands with alcohol wipes or gel. When should you call for help? Call your doctor now or seek immediate medical care if:    · You have a new or higher fever.     · Your fever lasts more than 48 hours.     · You have trouble breathing.     · You have a fever with a stiff neck or a severe headache.     · You are sensitive to light.     · You feel very sleepy or confused.    Watch closely for changes in your health, and be sure to contact your doctor if:    · You do not get better as expected. Where can you learn more? Go to https://[employer]ar.[employer]. org and sign in to your [employer] account. Enter N578 in the [facility] box to learn more about \"Viral Respiratory Infection: Care Instructions. \"     If you do not have an account, please click on the \"Sign Up Now\" link. Current as of: June 9, 2019  Content Version: 12.3  © 5940-5152 [employer]. Care instructions adapted under license by [address] If you have questions about a medical condition or this instruction, always ask your healthcare professional. [address] any warranty or liability for your use of this information.

## 2020-01-13 NOTE — PROGRESS NOTES
21 - 32 mmol/L Final    Anion Gap 08/16/2019 18* 3 - 16 Final    Glucose 08/16/2019 171* 70 - 99 mg/dL Final    BUN 08/16/2019 17  7 - 20 mg/dL Final    CREATININE 08/16/2019 <0.5* 0.6 - 1.2 mg/dL Final    Comment: Specimen icterus has exceeded the interference as defined by Roche. Result may be affected.  GFR Non- 08/16/2019 >60  >60 Final    Comment: >60 mL/min/1.73m2 EGFR, calc. for ages 25 and older using the  MDRD formula (not corrected for weight), is valid for stable  renal function.  GFR  08/16/2019 >60  >60 Final    Comment: Chronic Kidney Disease: less than 60 ml/min/1.73 sq.m. Kidney Failure: less than 15 ml/min/1.73 sq.m. Results valid for patients 18 years and older.       Calcium 08/16/2019 9.9  8.3 - 10.6 mg/dL Final    Total Protein 08/16/2019 7.2  6.4 - 8.2 g/dL Final    Alb 08/16/2019 4.5  3.4 - 5.0 g/dL Final    Albumin/Globulin Ratio 08/16/2019 1.7  1.1 - 2.2 Final    Total Bilirubin 08/16/2019 5.8* 0.0 - 1.0 mg/dL Final    Alkaline Phosphatase 08/16/2019 259* 40 - 129 U/L Final    ALT 08/16/2019 1,686* 10 - 40 U/L Final    AST 08/16/2019 >7,000* 15 - 37 U/L Final    Globulin 08/16/2019 2.7  g/dL Final    WBC 08/16/2019 4.7  4.0 - 11.0 K/uL Final    RBC 08/16/2019 5.00  4.00 - 5.20 M/uL Final    Hemoglobin 08/16/2019 14.8  12.0 - 16.0 g/dL Final    Hematocrit 08/16/2019 44.7  36.0 - 48.0 % Final    MCV 08/16/2019 89.4  80.0 - 100.0 fL Final    MCH 08/16/2019 29.5  26.0 - 34.0 pg Final    MCHC 08/16/2019 33.0  31.0 - 36.0 g/dL Final    RDW 08/16/2019 15.5* 12.4 - 15.4 % Final    Platelets 41/77/0898 261  135 - 450 K/uL Final    MPV 08/16/2019 8.6  5.0 - 10.5 fL Final    Neutrophils % 08/16/2019 69.9  % Final    Lymphocytes % 08/16/2019 18.2  % Final    Monocytes % 08/16/2019 8.4  % Final    Eosinophils % 08/16/2019 2.7  % Final    Basophils % 08/16/2019 0.8  % Final    Neutrophils Absolute 08/16/2019 3.3  1.7 - 7.7 K/uL Final    Lymphocytes Absolute 08/16/2019 0.9* 1.0 - 5.1 K/uL Final    Monocytes Absolute 08/16/2019 0.4  0.0 - 1.3 K/uL Final    Eosinophils Absolute 08/16/2019 0.1  0.0 - 0.6 K/uL Final    Basophils Absolute 08/16/2019 0.0  0.0 - 0.2 K/uL Final    Urine Culture, Routine 08/16/2019 <10,000 CFU/ml mixed skin/urogenital chico. No further workup   Final       Family History   Problem Relation Age of Onset    Coronary Art Dis Father     Cancer Father         prostate    Cancer Maternal Grandmother         colon    Cancer Mother         breast    Diabetes Brother        Current Outpatient Medications   Medication Sig Dispense Refill    dexamethasone (DECADRON) 4 MG tablet Take 4 mg by mouth daily  3    fluconazole (DIFLUCAN) 100 MG tablet Take 100 mg by mouth daily  3    lisinopril (PRINIVIL;ZESTRIL) 10 MG tablet TAKE 1 TABLET BY MOUTH EVERY DAY 90 tablet 0    pantoprazole (PROTONIX) 40 MG tablet Take 40 mg by mouth      polyethylene glycol (GLYCOLAX) powder DIS 17 GRAMS INTO LIQUID AND DRINK PO BID PRF CONSTIPATION  0    ondansetron (ZOFRAN ODT) 8 MG TBDP disintegrating tablet Take 1 tablet by mouth every 8 hours as needed for Nausea 12 tablet 1    L-methylfolate-B6-B12 (METANX) 0-14.087-8-68 MG CAPS capsule TAKE ONE CAPSULE BY MOUTH TWICE A  capsule 0    b complex vitamins capsule Take 1 capsule by mouth daily      loratadine (CLARITIN) 10 MG tablet Take 10 mg by mouth daily.  Cholecalciferol (VITAMIN D PO) Take 1,000 Units by mouth daily        No current facility-administered medications for this visit. Allergies   Allergen Reactions    Latex Rash    Penicillins Hives and Swelling     General body swelling      Sulfa Antibiotics Hives and Rash    Amoxicillin        Review of Systems   Constitutional: Positive for fatigue. Negative for chills, diaphoresis and fever.         Body aches   HENT: Positive for ear pain (right ear), postnasal drip, rhinorrhea, sinus pressure and motion. Lymphadenopathy:      Cervical: No cervical adenopathy. Skin:     General: Skin is warm and dry. Capillary Refill: Capillary refill takes less than 2 seconds. Neurological:      General: No focal deficit present. Mental Status: She is alert and oriented to person, place, and time. Psychiatric:         Mood and Affect: Mood normal.         Behavior: Behavior normal. Behavior is cooperative. Thought Content: Thought content normal.         Judgment: Judgment normal.         Assessment/Plan     1. Viral URI  Suspect viral. Given OTC management education- Mucinex, Flonase, Delsym, push fluids, throat lozenges, and Zyrtec. If no improvement within 2 days will fill printed zpack Rx.  - azithromycin (ZITHROMAX) 250 MG tablet; Take 1 tablet by mouth See Admin Instructions for 5 days 500mg on day 1 followed by 250mg on days 2 - 5  Dispense: 6 tablet; Refill: 0      No orders of the defined types were placed in this encounter. Return if symptoms worsen or fail to improve.     Arnulfo Simons NP    1/13/2020  3:59 PM

## 2020-01-27 NOTE — TELEPHONE ENCOUNTER
PT CAME IN FOR A WALK IN VISIT. PT WAS SEEN A COUPLE OF WEEKS AGO FOR URI SYMPTOMS AND TREATED WITH ZPAK. PT STILL HAVING SX'S OF NASAL DR, CONGESTED COUGH, SORE THROAT, PND, LEFT EAR PAIN, FATIGUE AND SORE IN HER MOUTH. PT IS CURRENTLY ON CHEMO FOR PANCREATIC AND LUNG CANCER. SHE IS TAKING STEROIDS FROM THE ONCOLOGIST AND 4 DAYS REMAINING. PER LYLE, CALL IN DOXYCYCLINE 100 MG SI PO BID X10 DAYS AND MAGIC MOUTHWASH SI ML THREE TIMES A DAY X10 DAYS. PER AM, CONTINUE TO USE MOUTHWASH FOR THRUSH UNTIL SHE FINISHES ABX. FOLLOW UP WITH A SAME DAY VISIT IF DOES NOT IMPROVE OR WORSENS AFTER 5 DAYS. PT AGREED WITH THE PLAN. ZT'A SENT TO [facility].   [address] [place] [address]

## 2020-01-27 NOTE — TELEPHONE ENCOUNTER
Please ask pharmacist for ingredients to make this per their pharmacy - I will give a verbal order to prescribe

## 2020-08-18 ENCOUNTER — TELEPHONE (OUTPATIENT)
Dept: FAMILY MEDICINE CLINIC | Age: 65
End: 2020-08-18

## 2020-08-18 NOTE — TELEPHONE ENCOUNTER
PT  BECAUSE OF PANCREATIC CANCER. CALLED AND SPOKE TO SHYAM AT THE  HOME AND ADVISED HER OF THIS.  SC

## 2020-08-18 NOTE — TELEPHONE ENCOUNTER
Ernestina from [facility] called to see if provider would sign death certificate. Patient passed away on 8/16/20.

## 2020-10-29 RX ORDER — LISINOPRIL 10 MG/1
TABLET ORAL
Qty: 90 TABLET | Refills: 0 | OUTPATIENT
Start: 2020-10-29